# Patient Record
Sex: MALE | Race: OTHER
[De-identification: names, ages, dates, MRNs, and addresses within clinical notes are randomized per-mention and may not be internally consistent; named-entity substitution may affect disease eponyms.]

---

## 2018-06-02 ENCOUNTER — HOSPITAL ENCOUNTER (INPATIENT)
Dept: HOSPITAL 17 - NEPE | Age: 63
LOS: 2 days | Discharge: HOME | DRG: 123 | End: 2018-06-04
Payer: SELF-PAY

## 2018-06-02 VITALS
DIASTOLIC BLOOD PRESSURE: 77 MMHG | OXYGEN SATURATION: 97 % | HEART RATE: 88 BPM | SYSTOLIC BLOOD PRESSURE: 154 MMHG | TEMPERATURE: 97.5 F | RESPIRATION RATE: 18 BRPM

## 2018-06-02 VITALS
RESPIRATION RATE: 14 BRPM | DIASTOLIC BLOOD PRESSURE: 80 MMHG | SYSTOLIC BLOOD PRESSURE: 167 MMHG | HEART RATE: 96 BPM | OXYGEN SATURATION: 97 %

## 2018-06-02 VITALS
DIASTOLIC BLOOD PRESSURE: 86 MMHG | SYSTOLIC BLOOD PRESSURE: 179 MMHG | OXYGEN SATURATION: 96 % | RESPIRATION RATE: 15 BRPM | HEART RATE: 88 BPM | TEMPERATURE: 98.6 F

## 2018-06-02 VITALS
HEART RATE: 88 BPM | DIASTOLIC BLOOD PRESSURE: 96 MMHG | TEMPERATURE: 98.3 F | RESPIRATION RATE: 16 BRPM | OXYGEN SATURATION: 98 % | SYSTOLIC BLOOD PRESSURE: 206 MMHG

## 2018-06-02 VITALS — OXYGEN SATURATION: 97 %

## 2018-06-02 VITALS — BODY MASS INDEX: 34.6 KG/M2 | WEIGHT: 220.46 LBS | HEIGHT: 67 IN

## 2018-06-02 VITALS — OXYGEN SATURATION: 96 %

## 2018-06-02 DIAGNOSIS — Z95.5: ICD-10-CM

## 2018-06-02 DIAGNOSIS — H49.21: Primary | ICD-10-CM

## 2018-06-02 DIAGNOSIS — Z79.84: ICD-10-CM

## 2018-06-02 DIAGNOSIS — I10: ICD-10-CM

## 2018-06-02 DIAGNOSIS — E78.5: ICD-10-CM

## 2018-06-02 DIAGNOSIS — I25.10: ICD-10-CM

## 2018-06-02 DIAGNOSIS — R29.810: ICD-10-CM

## 2018-06-02 DIAGNOSIS — I25.2: ICD-10-CM

## 2018-06-02 DIAGNOSIS — Z79.82: ICD-10-CM

## 2018-06-02 DIAGNOSIS — E11.65: ICD-10-CM

## 2018-06-02 DIAGNOSIS — R20.0: ICD-10-CM

## 2018-06-02 LAB
ALBUMIN SERPL-MCNC: 3.5 GM/DL (ref 3.4–5)
ALP SERPL-CCNC: 108 U/L (ref 45–117)
ALT SERPL-CCNC: 33 U/L (ref 12–78)
APAP SERPL-MCNC: (no result) MCG/ML (ref 10–30)
AST SERPL-CCNC: 21 U/L (ref 15–37)
BASOPHILS # BLD AUTO: 0 TH/MM3 (ref 0–0.2)
BASOPHILS NFR BLD: 0.6 % (ref 0–2)
BILIRUB SERPL-MCNC: 0.3 MG/DL (ref 0.2–1)
BUN SERPL-MCNC: 15 MG/DL (ref 7–18)
CALCIUM SERPL-MCNC: 8.7 MG/DL (ref 8.5–10.1)
CHLORIDE SERPL-SCNC: 99 MEQ/L (ref 98–107)
COLOR UR: YELLOW
CREAT SERPL-MCNC: 0.81 MG/DL (ref 0.6–1.3)
EOSINOPHIL # BLD: 0.1 TH/MM3 (ref 0–0.4)
EOSINOPHIL NFR BLD: 1.4 % (ref 0–4)
ERYTHROCYTE [DISTWIDTH] IN BLOOD BY AUTOMATED COUNT: 13.1 % (ref 11.6–17.2)
GFR SERPLBLD BASED ON 1.73 SQ M-ARVRAT: 97 ML/MIN (ref 89–?)
GLUCOSE SERPL-MCNC: 231 MG/DL (ref 74–106)
GLUCOSE UR STRIP-MCNC: 1000 MG/DL
HCO3 BLD-SCNC: 25.6 MEQ/L (ref 21–32)
HCT VFR BLD CALC: 43.6 % (ref 39–51)
HGB BLD-MCNC: 15 GM/DL (ref 13–17)
HGB UR QL STRIP: (no result)
INR PPP: 1 RATIO
KETONES UR STRIP-MCNC: (no result) MG/DL
LYMPHOCYTES # BLD AUTO: 3.5 TH/MM3 (ref 1–4.8)
LYMPHOCYTES NFR BLD AUTO: 42.6 % (ref 9–44)
MCH RBC QN AUTO: 31.7 PG (ref 27–34)
MCHC RBC AUTO-ENTMCNC: 34.5 % (ref 32–36)
MCV RBC AUTO: 91.8 FL (ref 80–100)
MONOCYTE #: 0.6 TH/MM3 (ref 0–0.9)
MONOCYTES NFR BLD: 7.2 % (ref 0–8)
NEUTROPHILS # BLD AUTO: 4 TH/MM3 (ref 1.8–7.7)
NEUTROPHILS NFR BLD AUTO: 48.2 % (ref 16–70)
NITRITE UR QL STRIP: (no result)
PLATELET # BLD: 257 TH/MM3 (ref 150–450)
PMV BLD AUTO: 7.8 FL (ref 7–11)
PROT SERPL-MCNC: 8 GM/DL (ref 6.4–8.2)
PROTHROMBIN TIME: 10.1 SEC (ref 9.8–11.6)
RBC # BLD AUTO: 4.75 MIL/MM3 (ref 4.5–5.9)
SODIUM SERPL-SCNC: 136 MEQ/L (ref 136–145)
SP GR UR STRIP: 1.03 (ref 1–1.03)
SQUAMOUS #/AREA URNS HPF: <1 /HPF (ref 0–5)
TROPONIN I SERPL-MCNC: (no result) NG/ML (ref 0.02–0.05)
URINE LEUKOCYTE ESTERASE: (no result)
WBC # BLD AUTO: 8.2 TH/MM3 (ref 4–11)

## 2018-06-02 PROCEDURE — 93005 ELECTROCARDIOGRAM TRACING: CPT

## 2018-06-02 PROCEDURE — 83880 ASSAY OF NATRIURETIC PEPTIDE: CPT

## 2018-06-02 PROCEDURE — 84443 ASSAY THYROID STIM HORMONE: CPT

## 2018-06-02 PROCEDURE — 70450 CT HEAD/BRAIN W/O DYE: CPT

## 2018-06-02 PROCEDURE — 80307 DRUG TEST PRSMV CHEM ANLYZR: CPT

## 2018-06-02 PROCEDURE — 80053 COMPREHEN METABOLIC PANEL: CPT

## 2018-06-02 PROCEDURE — 82550 ASSAY OF CK (CPK): CPT

## 2018-06-02 PROCEDURE — 93306 TTE W/DOPPLER COMPLETE: CPT

## 2018-06-02 PROCEDURE — 85652 RBC SED RATE AUTOMATED: CPT

## 2018-06-02 PROCEDURE — 84484 ASSAY OF TROPONIN QUANT: CPT

## 2018-06-02 PROCEDURE — 86430 RHEUMATOID FACTOR TEST QUAL: CPT

## 2018-06-02 PROCEDURE — 83690 ASSAY OF LIPASE: CPT

## 2018-06-02 PROCEDURE — 83036 HEMOGLOBIN GLYCOSYLATED A1C: CPT

## 2018-06-02 PROCEDURE — 85730 THROMBOPLASTIN TIME PARTIAL: CPT

## 2018-06-02 PROCEDURE — 71045 X-RAY EXAM CHEST 1 VIEW: CPT

## 2018-06-02 PROCEDURE — 93880 EXTRACRANIAL BILAT STUDY: CPT

## 2018-06-02 PROCEDURE — 85610 PROTHROMBIN TIME: CPT

## 2018-06-02 PROCEDURE — 86038 ANTINUCLEAR ANTIBODIES: CPT

## 2018-06-02 PROCEDURE — 81001 URINALYSIS AUTO W/SCOPE: CPT

## 2018-06-02 PROCEDURE — 86140 C-REACTIVE PROTEIN: CPT

## 2018-06-02 PROCEDURE — 82948 REAGENT STRIP/BLOOD GLUCOSE: CPT

## 2018-06-02 PROCEDURE — 70544 MR ANGIOGRAPHY HEAD W/O DYE: CPT

## 2018-06-02 PROCEDURE — 70551 MRI BRAIN STEM W/O DYE: CPT

## 2018-06-02 PROCEDURE — 80061 LIPID PANEL: CPT

## 2018-06-02 PROCEDURE — 85025 COMPLETE CBC W/AUTO DIFF WBC: CPT

## 2018-06-02 RX ADMIN — ACYCLOVIR SCH UNITS: 800 TABLET ORAL at 21:00

## 2018-06-02 RX ADMIN — Medication SCH ML: at 21:00

## 2018-06-02 RX ADMIN — PHENYTOIN SODIUM SCH MLS/HR: 50 INJECTION INTRAMUSCULAR; INTRAVENOUS at 20:00

## 2018-06-02 RX ADMIN — PRAVASTATIN SODIUM SCH MG: 40 TABLET ORAL at 21:00

## 2018-06-02 RX ADMIN — STANDARDIZED SENNA CONCENTRATE AND DOCUSATE SODIUM SCH TAB: 8.6; 5 TABLET, FILM COATED ORAL at 21:00

## 2018-06-02 RX ADMIN — ACETAMINOPHEN PRN MG: 325 TABLET ORAL at 20:56

## 2018-06-02 RX ADMIN — PHENYTOIN SODIUM SCH MLS/HR: 50 INJECTION INTRAMUSCULAR; INTRAVENOUS at 20:52

## 2018-06-02 RX ADMIN — INSULIN ASPART SCH: 100 INJECTION, SOLUTION INTRAVENOUS; SUBCUTANEOUS at 21:00

## 2018-06-02 NOTE — RADRPT
EXAM DATE:  6/2/2018 4:56 PM EDT

AGE/SEX:        63 years / Male



INDICATIONS:  Syncope. Double vision with headaches.



CLINICAL DATA:  This is the patient's initial encounter. Patient reports that signs and symptoms have
 been present for 1 day and indicates a pain score of 0/10. 

                                                                          

MEDICAL/SURGICAL HISTORY:       Hypertension.  Diabetes mellitus type II. . Cardiac stents.



COMPARISON:      [facility] [place], CHEST SINGLE AP, 2/16/2018.  .



FINDINGS:  

A single AP view of the chest demonstrates the lungs to be symmetrically aerated without evidence of 
mass, infiltrate or effusion.  The cardiomediastinal contours are unremarkable.  Osseous structures a
re intact.  





CONCLUSION: 

No evidence of acute cardiopulmonary disease.



Electronically signed by: Abdiaziz Wakefield MD  6/2/2018 5:14 PM EDT

## 2018-06-02 NOTE — PD
HPI


Chief Complaint:  Neuro Symptoms/ Deficits


Time Seen by Provider:  16:41


Travel History


International Travel<30 days:  No


Contact w/Intl Traveler<30days:  No


Traveled to known affect area:  No





History of Present Illness


HPI


Patient has had double vision episode for the past 4 days, intermittent however 

has now become more and more frequent.  He has double vision when both eyes are 

open.  But he no longer has double vision once he closes one eye or the other.  

Patient states that he has a ear fullness and ringing, and when he moves her 

head suddenly he gets room spinning sensation. additionally patient has noted 

flattening/droop to his right face since 3 days ago, most pronounced 3d ago but 

has improved according to patient over time.  patient denied loss of vision, 

gait unsteadiness, ha, cp, fever, paralysis of his extremities.  





No known drug allergy


Past medical history significant for corrective lenses, MI with stent placed, 

hypertension, appendectomy, diabetes





PFSH


Past Medical History


Anxiety:  No


Depression:  No


Cancer:  No


Cardiovascular Problems:  Yes


Diabetes:  Yes


Diminished Hearing:  No


Genitourinary:  No


Hypertension:  Yes


Immune Disorder:  No


Implanted Vascular Access Dvce:  Yes


Musculoskeletal:  No


Neurologic:  No


Psychiatric:  No


Reproductive:  No


Respiratory:  No


Sickle Cell Disease:  No





Past Surgical History


Appendectomy:  Yes


Body Medical Devices:  STENTS


Cardiac Surgery:  Yes (STENTS PLACED, CARDIAC CATH)


Other Surgery:  Yes





Social History


Alcohol Use:  No


Tobacco Use:  No


Substance Use:  No





Allergies-Medications


(Allergen,Severity, Reaction):  


Coded Allergies:  


     No Known Allergies (Verified  Allergy, Unknown, 3/1/18)


Reported Meds & Prescriptions





Reported Meds & Active Scripts


Active


Novolog Inj (Insulin Aspart) 1,000 Unit/10 Ml Vial 2-12 Units SQ ACHS


     Max dose at bedtime ( 8) units; sugars less than 70,(0) units; sugars


     150-199,(2) units; sugars 200-249,(4) units; sugars 250-299,(7) units;


     sugars 300-349,(10) units; sugars greater than 349,(12)units


Lisinopril 10 Mg Tab 10 Mg PO DAILY


Lantus Inj (Insulin Glargine) 1,000 Unit/10 Ml Vial 40 Units SQ HS


Reported


Gabapentin 600 Mg Tab 600 Mg PO TID


Metformin (Metformin HCl) 1,000 Mg Tab 1,000 Mg PO BIDPC








Review of Systems


General / Constitutional:  No: Fever


Eyes:  Positive: Diploplia, No: Visual changes


HENT:  Positive: Vertigo, No: Headaches


Cardiovascular:  No: Chest Pain or Discomfort


Respiratory:  No: Shortness of Breath


Gastrointestinal:  No: Abdominal Pain


Genitourinary:  No: Dysuria


Musculoskeletal:  No: Pain


Skin:  No Rash


Neurologic:  Positive: Weakness, Slurred Speech (4 days ago)


Psychiatric:  No: Depression


Endocrine:  No: Polydipsia


Hematologic/Lymphatic:  No: Easy Bruising





Physical Exam


Narrative


GENERAL: 


SKIN: Warm and dry.


HEAD: Atraumatic. Normocephalic. 


EYES: Pupils equal and round. No scleral icterus. No injection or drainage.  

Right medial eye deviation/strabismus, however patient still has near full 

range of motion in all quadrants, this is most consistent with a lateral rectus 

abducens palsy, pupils are equal round and reactive to light,


ENT: No nasal bleeding or discharge.  Mucous membranes pink and moist.no 

hemotympanum


NECK: Trachea midline. No JVD. no carotid murmur


CARDIOVASCULAR: Regular rate and rhythm.  


RESPIRATORY: No accessory muscle use. Clear to auscultation. Breath sounds 

equal bilaterally. 


GASTROINTESTINAL: Abdomen soft, non-tender, nondistended.


MUSCULOSKELETAL: Extremities without clubbing, cyanosis, or edema. No obvious 

deformities. 


NEUROLOGICAL: Awake and alert. No obvious cranial nerve deficits.  Motor 

grossly within normal limits. Five out of 5 muscle strength in the arms and 

legs.  Normal speech. EXCEPT for a slight right sided facial droop noted


PSYCHIATRIC: Appropriate mood and affect; insight and judgment normal.





Data


Data


Last Documented VS





Vital Signs








  Date Time  Temp Pulse Resp B/P (MAP) Pulse Ox O2 Delivery O2 Flow Rate FiO2


 


6/2/18 16:55  96 14 167/80 (109) 97 Room Air  


 


6/2/18 16:34 98.3       








Orders





 Orders


Electrocardiogram (6/2/18 16:41)


Complete Blood Count With Diff (6/2/18 16:41)


Comprehensive Metabolic Panel (6/2/18 16:41)


Ckmb (Isoenzyme) Profile (6/2/18 16:41)


Troponin I (6/2/18 16:41)


B-Type Natriuretic Peptide (6/2/18 16:41)


Prothrombin Time / Inr (Pt) (6/2/18 16:41)


Act Partial Throm Time (Ptt) (6/2/18 16:41)


Lipase (6/2/18 16:41)


Urinalysis - C+S If Indicated (6/2/18 16:41)


Thyroid Stimulating Hormone (6/2/18 16:41)


Chest, Single Ap (6/2/18 16:41)


Ct Brain W/O Iv Contrast(Rout) (6/2/18 16:41)


Iv Access Insert/Monitor (6/2/18 16:41)


Ecg Monitoring (6/2/18 16:41)


Oximetry (6/2/18 16:41)


Drug Screen, Random Urine (6/2/18 16:41)


Alcohol (Ethanol) (6/2/18 16:41)


Salicylates (Aspirin) (6/2/18 16:41)


Tylenol (Acetaminophen) (6/2/18 16:41)


Bedside Glucose JANEEN.CSUGAR (6/2/18 19:37)


Blood Glucose Goal (Criteria) (6/2/18 19:37)


Hypoglycemia 70 Mg/Dl Or < (6/2/18 19:37)


Notify Dr: Other (6/2/18 19:37)


Dextrose 50% In Rose (Vial) Inj (D50w (Vi (6/2/18 19:45)


Glucagon Inj (Glucagon Inj) (6/2/18 19:45)


Insulin Aspart Supplemtl Scale (Novolog (6/2/18 21:00)


Hemoglobin (Hgb) A1c (6/3/18 06:00)


Lipid Profile (6/3/18 06:00)


Westergren Sedimentation Rate (6/2/18 19:37)


C-Reactive Protein (Crp) (6/2/18 19:37)


Rheumatoid Screen/Titer (Rf) (6/3/18 06:00)


Iona Screen (6/3/18 06:00)


Consult Neurology (6/2/18 )


Admit To Inpatient (6/2/18 )


Code Status (6/2/18 19:37)


Vital Signs (Adult) Q4H (6/2/18 19:37)


Nih Stroke Scale - Nihss .On admission and discharge (6/2/18 19:37)


Neuro Checks Q4H (6/2/18 19:37)


Consult Pt Eval & Treat (6/2/18 19:37)


Case Management Consult (6/2/18 )


Activity Bed Rest (6/2/18 19:37)


Nursing Bedside Swallow Assess .ONCE (6/2/18 19:37)


Scd Bilateral/Knee High JANEEN.QSHIFT (6/2/18 19:37)


Mra Brain W/O Contrast (Cow) (6/2/18 )


Mri Brain W/O Contrast (6/2/18 )


Echo 2d Comp With Doppler (6/2/18 )


Resp Oxygen Nc Stroke (6/2/18 )


^ Hold Medication (6/2/18 19:37)


Sodium Chloride 0.9% Flush (Ns Flush) (6/2/18 21:00)


Sodium Chloride 0.9% Flush (Ns Flush) (6/2/18 19:45)


Sodium Chlor 0.9% 1000 Ml Inj (Ns 1000 M (6/2/18 20:00)


Enalaprilat Inj (Vasotec Inj) (6/2/18 19:45)


Aspirin Chew (Aspirin Chew) (6/3/18 09:00)


Pravastatin (Pravachol) (6/2/18 21:00)


Bedside Glucose JANEEN.CSUGAR (6/2/18 19:37)


^ Discontinue Insulin Orders (6/2/18 19:37)


Insulin Aspart Supplemtl Scale (Novolog (6/2/18 21:00)


Dextrose 50% In Rose (Vial) Inj (D50w (Vi (6/2/18 19:45)


Glucagon Inj (Glucagon Inj) (6/2/18 19:45)


Cardiac Monitor / Telemetry JANEEN.Q8H (6/2/18 19:37)


Consult Stroke Navigator (6/2/18 )


Scd Bilateral/Knee High JANEEN.BID (6/2/18 19:37)


Dave Bilateral/Knee High JANEEN.QSHIFT (6/2/18 19:41)


Inpatient Certification (6/2/18 )


Sodium Chloride 0.9% Flush (Ns Flush) (6/2/18 19:45)


Sodium Chloride 0.9% Flush (Ns Flush) (6/2/18 21:00)


Comprehensive Metabolic Panel (6/3/18 06:00)


Complete Blood Count With Diff (6/3/18 06:00)


Troponin I (6/3/18 00:00)


Troponin I (6/3/18 06:00)


Acetaminophen (Tylenol) (6/2/18 19:45)


Acetamin-Hydrocod 325-5 Mg (Norco  5-325 (6/2/18 19:45)


Docusate Sodium-Senna (Meredith-Colace) (6/2/18 21:00)


Magnesium Hydroxide Liq (Milk Of Magnesi (6/2/18 19:45)


Sennosides (Senokot) (6/2/18 19:45)


Bisacodyl Supp (Dulcolax Supp) (6/2/18 19:45)


Lactulose Liq (Lactulose Liq) (6/2/18 19:45)


Insulin Detemir Inj (Levemir Inj) (6/2/18 21:00)


Morphine Inj (Morphine Inj) (6/2/18 20:00)


(Hub Use Only)Inp Phy Cons/Ref (6/2/18 )


Admit Order (Ed Use Only) (6/2/18 20:20)





Labs





Laboratory Tests








Test


  6/2/18


16:55 6/2/18


18:15


 


White Blood Count 8.2 TH/MM3  


 


Red Blood Count 4.75 MIL/MM3  


 


Hemoglobin 15.0 GM/DL  


 


Hematocrit 43.6 %  


 


Mean Corpuscular Volume 91.8 FL  


 


Mean Corpuscular Hemoglobin 31.7 PG  


 


Mean Corpuscular Hemoglobin


Concent 34.5 % 


  


 


 


Red Cell Distribution Width 13.1 %  


 


Platelet Count 257 TH/MM3  


 


Mean Platelet Volume 7.8 FL  


 


Neutrophils (%) (Auto) 48.2 %  


 


Lymphocytes (%) (Auto) 42.6 %  


 


Monocytes (%) (Auto) 7.2 %  


 


Eosinophils (%) (Auto) 1.4 %  


 


Basophils (%) (Auto) 0.6 %  


 


Neutrophils # (Auto) 4.0 TH/MM3  


 


Lymphocytes # (Auto) 3.5 TH/MM3  


 


Monocytes # (Auto) 0.6 TH/MM3  


 


Eosinophils # (Auto) 0.1 TH/MM3  


 


Basophils # (Auto) 0.0 TH/MM3  


 


CBC Comment DIFF FINAL  


 


Differential Comment   


 


Prothrombin Time 10.1 SEC  


 


Prothromb Time International


Ratio 1.0 RATIO 


  


 


 


Activated Partial


Thromboplast Time 26.9 SEC 


  


 


 


Blood Urea Nitrogen 15 MG/DL  


 


Creatinine 0.81 MG/DL  


 


Random Glucose 231 MG/DL  


 


Total Protein 8.0 GM/DL  


 


Albumin 3.5 GM/DL  


 


Calcium Level 8.7 MG/DL  


 


Alkaline Phosphatase 108 U/L  


 


Aspartate Amino Transf


(AST/SGOT) 21 U/L 


  


 


 


Alanine Aminotransferase


(ALT/SGPT) 33 U/L 


  


 


 


Total Bilirubin 0.3 MG/DL  


 


Sodium Level 136 MEQ/L  


 


Potassium Level 3.8 MEQ/L  


 


Chloride Level 99 MEQ/L  


 


Carbon Dioxide Level 25.6 MEQ/L  


 


Anion Gap 11 MEQ/L  


 


Estimat Glomerular Filtration


Rate 97 ML/MIN 


  


 


 


Total Creatine Kinase 96 U/L  


 


Troponin I


  LESS THAN 0.02


NG/ML 


 


 


C-Reactive Protein 0.62 MG/DL  


 


B-Type Natriuretic Peptide 6 PG/ML  


 


Lipase 133 U/L  


 


Thyroid Stimulating Hormone


3rd Gen 1.100 uIU/ML 


  


 


 


Salicylates Level


  LESS THAN 1.7


MG/DL 


 


 


Acetaminophen Level


  LESS THAN 2.0


MCG/ML 


 


 


Ethyl Alcohol Level


  LESS THAN 3


MG/DL 


 


 


Urine Color  YELLOW 


 


Urine Turbidity  CLEAR 


 


Urine pH  5.5 


 


Urine Specific Gravity  1.028 


 


Urine Protein  NEG mg/dL 


 


Urine Glucose (UA)  1000 mg/dL 


 


Urine Ketones  NEG mg/dL 


 


Urine Occult Blood  NEG 


 


Urine Nitrite  NEG 


 


Urine Bilirubin  NEG 


 


Urine Urobilinogen


  


  LESS THAN 2.0


MG/DL


 


Urine Leukocyte Esterase  NEG 


 


Urine RBC


  


  LESS THAN 1


/hpf


 


Urine WBC


  


  LESS THAN 1


/hpf


 


Urine Squamous Epithelial


Cells 


  <1 /hpf 


 


 


Microscopic Urinalysis Comment


  


  CULT NOT


INDICATED


 


Urine Opiates Screen  NEG 


 


Urine Barbiturates Screen  NEG 


 


Urine Amphetamines Screen  NEG 


 


Urine Benzodiazepines Screen  NEG 


 


Urine Cocaine Screen  NEG 


 


Urine Cannabinoids Screen  NEG 











MDM


Medical Decision Making


Medical Screen Exam Complete:  Yes


Emergency Medical Condition:  Yes


Medical Record Reviewed:  Yes


Interpretation(s)


EKG is normal sinus rhythm, 96 bpm, normal intervals, nonspecific ST-T wave 

changes, no ST elevation MI pattern noted


Differential Diagnosis


Abducens palsy versus cavernous sinus thrombosis versus aneurysm versus central 

vertigo versus brain stem injury


Narrative Course


CBC shows no leukocytosis, no anemia, normal platelet count, no left shift


Coagulation profile is within normal limits


toxicology screen negative for salicylates, Tylenol or alcohol


Electrolytes are all within normal limits except for random glucose of 231, 

normal kidney liver and pancreatic functions,


First set of cardiac enzymes negative


Head CT read by radiologist as negative noncontrast CT


Critical Care Narrative


CRITICAL CARE NOTE: With evaluation of the patient, labs, EKG, receipt of 

radiologic studies, administration of medications, reevaluation the patient and 

discussion of the patient with the admitting physicians, the total critical 

care time was [30] minutes. Time to perform other separately billable 

procedures was not included in the critical care time.





Diagnosis





 Primary Impression:  


 Abducent nerve palsy, right eye


 Additional Impression:  


 r/o CVA





Admitting Information


Admitting Physician Requests:  Observation











Scottie Jalloh MD Jun 2, 2018 16:48

## 2018-06-02 NOTE — HHI.HP
__________________________________________________





[facility]


Service


[facility]


Primary Care Physician


No Primary Care Physician


Admission Diagnosis





CVA, 6th nerve pasly


Diagnoses:  


(1) CVA (cerebral vascular accident)


Diagnosis:  Principal





(2) Abducens (sixth) nerve palsy


Diagnosis:  Principal





(3) HTN (hypertension)


Diagnosis:  Principal





(4) DM (diabetes mellitus)


Diagnosis:  Principal





Travel History


International Travel<30 Days:  No


Contact w/Intl Traveler <30 Da:  No


Traveled to Known Affected Are:  No


History of Present Illness


This is a 63 year male with PMH of HTN, DM and CAD who presented to ER with 

complaints of double vision x3 days.  States he was driving today and had 

significant difficulty seeing the road, states symptoms improve when he closes 

one eye.  Also notes facial droop and oral numbness/tingling since earlier this 

morning.  Denies h/o CVA.  Does note DM is uncontrolled w/ elevated BS despite 

Metformin/Insulin.  On arrival, /96, HR 88, O2 sat 98% on RA, Afebrile.  

CBC unremarkable.  Chemistry unremarkable except for .  Troponin 

negative.  INR 1.0.  Urine Drug Screen negative.  Alcohol negative.  UA 

negative for UTI.  CT Head negative.  CXR no acute findings.  On exam, patient 

noted to have right medial eye deviation w/ 6th nerve palsy.  No previous h/o 

similar symptoms.





Review of Systems


Except as stated in HPI:  all other systems reviewed are Neg


ROS: 14 point review of systems otherwise negative.





Past Family Social History


Past Medical History


PMH:  HTN, DM and CAD


Past Surgical History


PAST SURGICAL HISTORY: Cardiac Stents, Appendectomy


Allergies:  


Coded Allergies:  


     No Known Allergies (Verified  Allergy, Unknown, 3/1/18)


Family History


PAST FAMILY HISTORY:  Reviewed.  No h/o DM or CAD


Social History


PAST SOCIAL HISTORY: Negative for alcohol, tobacco or drugs





Physical Exam


Vital Signs





Vital Signs








  Date Time  Temp Pulse Resp B/P (MAP) Pulse Ox O2 Delivery O2 Flow Rate FiO2


 


18 20:54 98.6 88 15 179/86 (117) 96 Room Air  


 


18 16:55  96 14 167/80 (109) 97 Room Air  


 


18 16:50     97   


 


18 16:34 98.3 88 16 206/96 (132 98   








Physical Exam


PE:


GENERAL: Pleasant middle-aged  male in no acute distress.  Wife at 

bedside


HEENT: PERRLA, right eye bandaged, +right strabismus, medial deviation. No 

scleral icterus or conjunctival pallor. No lid lag or facial droop.  


CARDIOVASCULAR: Regular rate and rhythm.  No obvious murmurs to auscultation. 

No chest tenderness to palpation. 


RESPIRATORY: No obvious rhonchi or wheezing. Clear to auscultation. Breath 

sounds equal bilaterally. 


GASTROINTESTINAL: Abdomen soft, non-tender, nondistended. BS normal. 


MUSCULOSKELETAL: Extremities without clubbing, cyanosis, or edema. No obvious 

deformities. 


NEUROLOGICAL: Awake, alert and oriented x4. No focal neurologic deficits. 

Moving both upper and lower extremities spontaneously.


Laboratory





Laboratory Tests








Test


  18


16:55 18


18:15


 


White Blood Count 8.2  


 


Red Blood Count 4.75  


 


Hemoglobin 15.0  


 


Hematocrit 43.6  


 


Mean Corpuscular Volume 91.8  


 


Mean Corpuscular Hemoglobin 31.7  


 


Mean Corpuscular Hemoglobin


Concent 34.5 


  


 


 


Red Cell Distribution Width 13.1  


 


Platelet Count 257  


 


Mean Platelet Volume 7.8  


 


Neutrophils (%) (Auto) 48.2  


 


Lymphocytes (%) (Auto) 42.6  


 


Monocytes (%) (Auto) 7.2  


 


Eosinophils (%) (Auto) 1.4  


 


Basophils (%) (Auto) 0.6  


 


Neutrophils # (Auto) 4.0  


 


Lymphocytes # (Auto) 3.5  


 


Monocytes # (Auto) 0.6  


 


Eosinophils # (Auto) 0.1  


 


Basophils # (Auto) 0.0  


 


CBC Comment DIFF FINAL  


 


Differential Comment   


 


Prothrombin Time 10.1  


 


Prothromb Time International


Ratio 1.0 


  


 


 


Activated Partial


Thromboplast Time 26.9 


  


 


 


Blood Urea Nitrogen 15  


 


Creatinine 0.81  


 


Random Glucose 231  


 


Total Protein 8.0  


 


Albumin 3.5  


 


Calcium Level 8.7  


 


Alkaline Phosphatase 108  


 


Aspartate Amino Transf


(AST/SGOT) 21 


  


 


 


Alanine Aminotransferase


(ALT/SGPT) 33 


  


 


 


Total Bilirubin 0.3  


 


Sodium Level 136  


 


Potassium Level 3.8  


 


Chloride Level 99  


 


Carbon Dioxide Level 25.6  


 


Anion Gap 11  


 


Estimat Glomerular Filtration


Rate 97 


  


 


 


Total Creatine Kinase 96  


 


Troponin I LESS THAN 0.02  


 


B-Type Natriuretic Peptide 6  


 


Lipase 133  


 


Thyroid Stimulating Hormone


3rd Gen 1.100 


  


 


 


Salicylates Level LESS THAN 1.7  


 


Acetaminophen Level LESS THAN 2.0  


 


Ethyl Alcohol Level LESS THAN 3  


 


Urine Color  YELLOW 


 


Urine Turbidity  CLEAR 


 


Urine pH  5.5 


 


Urine Specific Gravity  1.028 


 


Urine Protein  NEG 


 


Urine Glucose (UA)  1000 


 


Urine Ketones  NEG 


 


Urine Occult Blood  NEG 


 


Urine Nitrite  NEG 


 


Urine Bilirubin  NEG 


 


Urine Urobilinogen  LESS THAN 2.0 


 


Urine Leukocyte Esterase  NEG 


 


Urine RBC  LESS THAN 1 


 


Urine WBC  LESS THAN 1 


 


Urine Squamous Epithelial


Cells 


  <1 


 


 


Microscopic Urinalysis Comment


  


  CULT NOT


INDICATED


 


Urine Opiates Screen  NEG 


 


Urine Barbiturates Screen  NEG 


 


Urine Amphetamines Screen  NEG 


 


Urine Benzodiazepines Screen  NEG 


 


Urine Cocaine Screen  NEG 


 


Urine Cannabinoids Screen  NEG 








Result Diagram:  


18








Caprini VTE Risk Assessment


Caprini VTE Risk Assessment:  No/Low Risk (score <= 1)


Caprini Risk Assessment Model











 Point Value = 1          Point Value = 2  Point Value = 3  Point Value = 5


 


Age 41-60


Minor surgery


BMI > 25 kg/m2


Swollen legs


Varicose veins


Pregnancy or postpartum


History of unexplained or recurrent


   spontaneous 


Oral contraceptives or hormone


   replacement


Sepsis (< 1 month)


Serious lung disease, including


   pneumonia (< 1 month)


Abnormal pulmonary function


Acute myocardial infarction


Congestive heart failure (< 1 month)


History of inflammatory bowel disease


Medical patient at bed rest Age 61-74


Arthroscopic surgery


Major open surgery (> 45 min)


Laparoscopic surgery (> 45 min)


Malignancy


Confined to bed (> 72 hours)


Immobilizing plaster cast


Central venous access Age >= 75


History of VTE


Family history of VTE


Factor V Leiden


Prothrombin 41324N


Lupus anticoagulant


Anticardiolipin antibodies


Elevated serum homocysteine


Heparin-induced thrombocytopenia


Other congenital or acquired


   thrombophilia Stroke (< 1 month)


Elective arthroplasty


Hip, pelvis, or leg fracture


Acute spinal cord injury (< 1 month)








Prophylaxis Regimen











   Total Risk


Factor Score Risk Level Prophylaxis Regimen


 


0-1      Low Early ambulation


 


2 Moderate Order ONE of the following:


*Sequential Compression Device (SCD)


*Heparin 5000 units SQ BID


 


3-4 Higher Order ONE of the following medications:


*Heparin 5000 units SQ TID


*Enoxaparin/Lovenox 40 mg SQ daily (WT < 150 kg, CrCl > 30 mL/min)


*Enoxaparin/Lovenox 30 mg SQ daily (WT < 150 kg, CrCl > 10-29 mL/min)


*Enoxaparin/Lovenox 30 mg SQ BID (WT < 150 kg, CrCl > 30 mL/min)


AND/OR


*Sequential Compression Device (SCD)


 


5 or more Highest Order ONE of the following medications:


*Heparin 5000 units SQ TID (Preferred with Epidurals)


*Enoxaparin/Lovenox 40 mg SQ daily (WT < 150 kg, CrCl > 30 mL/min)


*Enoxaparin/Lovenox 30 mg SQ daily (WT < 150 kg, CrCl > 10-29 mL/min)


*Enoxaparin/Lovenox 30 mg SQ BID (WT < 150 kg, CrCl > 30 mL/min)


AND


*Sequential Compression Device (SCD)











Assessment and Plan


Problem List:  


(1) CVA (cerebral vascular accident)


ICD Code:  I63.9 - Cerebral infarction, unspecified


(2) Abducens (sixth) nerve palsy


ICD Code:  H49.20 - Sixth [abducent] nerve palsy, unspecified eye


(3) HTN (hypertension)


ICD Code:  I10 - Essential (primary) hypertension


(4) DM (diabetes mellitus)


ICD Code:  E11.9 - Type 2 diabetes mellitus without complications


Assessment and Plan


A/P:


1.  CVA:  acute onset of facial droop and devi-oral numbness/tingling starting 

earlier this afternoon.  CT Head w/ no acute findings, images reviewed by me.  

Neuro checks, Check MRI/MRA to eval for underlying CVA/tumor.  ASA, Statin.  

Consult Neurology for further evaluation/recommendations.  Check Lipid profile, 

Hgb A1c.  Check serial cardiac enzymes to eval for underlying ischemia. 


2.  Abducens Nerve Palsy:  Right.  c/o double vision x3 days, in association w/ 

facial droop/oral numbness high concern for CVA.  Check MRI/MRA as above.  

Check ESR/CRP, TSH, Lipid Profile, Hgb A1c.  Right eye patch.  Neuro eval as 

above.  


3.  HTN:  Uncontrolled.   systolic, will allow for permissive HTN in 

light of CVA, antihypertensives prn for BP >220 systolic.


4.  DM:  Uncontrolled.  .  Hold Metformin.  Resume home Insulin.


5.  DVT Prophylaxis:  SCD/Teds


6.  Social work for d/c planning as needed. 


7.  Case discussed w/ ER physician at length, labs/records/imaging reviewed by 

me.





Physician Certification


2 Midnight Certification Type:  Admission for Inpatient Services


Order for Inpatient Services


The services are ordered in accordance with Medicare regulations or non-

Medicare payer requirements, as applicable.  In the case of services not 

specified as inpatient-only, they are appropriately provided as inpatient 

services in accordance with the 2-midnight benchmark.


Estimated LOS (days):  2


 days is the estimated time the patient will need to remain in the hospital, 

assuming treatment plan goals are met and no additional complications.


Post-Hospital Plan:  Not yet determined











Liudmila Baez MD 2018 21:14

## 2018-06-02 NOTE — RADRPT
EXAM DATE:  6/2/2018 8:19 PM EDT

AGE/SEX:        63 years / Male



INDICATIONS:    .  Diplopia.



CLINICAL DATA:  This is the patient's initial encounter. Patient reports that signs and symptoms have
 been present for 1 day and indicates a pain score of 0/10. 

                                                                          

MEDICAL/SURGICAL HISTORY:       Diabetes mellitus type II.  Hypertension.  Cardiovascular disease. CA
BG.  Coronary artery stent.  Appendectomy.  Peritonitis.



[place]      [facility] [place].  .



TECHNIQUE: Multiplanar, multisequence examination of the brain was performed without contrast.



FINDINGS:  

Cerebrum:  The ventricles are normal for age.  No evidence of midline shift, mass lesion, hemorrhage 
or acute infarction.  No extraaxial fluid collections are seen.  The pituitary gland and suprasellar 
cistern are normal in configuration.

White Matter:  Minimal periventricular and subcortical white matter small vessel ischemic changes are
 noted.

Posterior Fossa: The cerebellum and brainstem are intact.  The 4th ventricle is midline.  The cerebel
lopontine angle is unremarkable.  The cerebellar tonsils are normal in position.

Diffusion Imaging:  No focal areas of restricted diffusion are seen.  No evidence of acute infarction
.

Extracranial:  The visualized portions of the orbits and paranasal sinuses are unremarkable.



CONCLUSION: 

1.  Minimal periventricular and subcortical white matter small vessel ischemic changes.

2.  No acute infarct, acute hemorrhage, mass effect or extra-axial fluid collections.



Electronically signed by: Gennaro Lucas MD  6/2/2018 8:29 PM EDT

## 2018-06-02 NOTE — RADRPT
EXAM DATE:  6/2/2018 6:06 PM EDT

AGE/SEX:        63 years / Male



INDICATIONS:  Visual changes, double vision, right facial droop and headache for four days.



CLINICAL DATA:  This is the patient's initial encounter. Patient reports that signs and symptoms have
 been present for 4 - 6 days and indicates a pain score of 2/10. 

                                                                          

MEDICAL/SURGICAL HISTORY:   Cardiovascular disease.  Hypertension.  Diabetes mellitus type I. Appende
ctomy.



RADIATION DOSE:  37.50 CTDI (mGy)







COMPARISON:      No prior [facility] exams available for comparison.





TECHNIQUE:  CT of the head without contrast.  Using automated exposure control and adjustment of the 
mA and/or kV according to patient size, radiation dose was kept as low as reasonably achievable to ob
tain optimal diagnostic quality images.



FINDINGS: 

Cerebrum:  The ventricles are normal for age.  No evidence of midline shift, mass lesion, hemorrhage 
or acute infarction.  No extraaxial fluid collections are seen.

Posterior Fossa:  The cerebellum and brainstem are intact.  The 4th ventricle is midline.  The cerebe
llopontine angle is unremarkable.

Extracranial:  The visualized portion of the orbits is intact.

Skull:  The calvaria is intact.  No evidence of skull fracture.





CONCLUSION:

1.  Negative noncontrast head CT.



Electronically signed by: Abdiaziz Wakefield MD  6/2/2018 6:10 PM EDT

## 2018-06-02 NOTE — RADRPT
EXAM DATE:  6/2/2018 8:19 PM EDT

AGE/SEX:        63 years / Male



INDICATIONS:  . Diplopia.



CLINICAL DATA:  This is the patient's initial encounter. Patient reports that signs and symptoms have
 been present for 1 day and indicates a pain score of 0/10. 

                                                                          

MEDICAL/SURGICAL HISTORY:       Diabetes mellitus type II.  Heart disease.  Hypertension. Coronary ar
lamin stent.  CABG.  Appendectomy.  Peritonitis.



COMPARISON:      No prior [facility] exams available for comparison.



TECHNIQUE:     3D time-of-flight MRA was performed.  Source images, multiplanar STS MIP, and 3D volum
e MIP reconstructions were reviewed.



FINDINGS:     

There is excellent visualization of the major intracranial arteries out to the second-order branch ve
ssels.  While to moderate focal stenosis is noted involving the right proximal posterior cerebral art
abner. There is no evidence for aneurysm, vessel truncation, and no evidence for vascular malformation.
 There is a patent right posterior communicating artery.



CONCLUSION: 

1.  Mild to moderate focal stenosis involving the right proximal posterior cerebral artery.

2.  Patent right posterior communicating artery.





Electronically signed by: Gennaro Lucas MD  6/2/2018 8:32 PM EDT

## 2018-06-03 VITALS
TEMPERATURE: 97.2 F | HEART RATE: 81 BPM | RESPIRATION RATE: 18 BRPM | SYSTOLIC BLOOD PRESSURE: 170 MMHG | DIASTOLIC BLOOD PRESSURE: 89 MMHG | OXYGEN SATURATION: 97 %

## 2018-06-03 VITALS
TEMPERATURE: 97.4 F | SYSTOLIC BLOOD PRESSURE: 122 MMHG | RESPIRATION RATE: 18 BRPM | HEART RATE: 69 BPM | OXYGEN SATURATION: 96 % | DIASTOLIC BLOOD PRESSURE: 59 MMHG

## 2018-06-03 VITALS
DIASTOLIC BLOOD PRESSURE: 83 MMHG | OXYGEN SATURATION: 96 % | HEART RATE: 81 BPM | RESPIRATION RATE: 18 BRPM | TEMPERATURE: 97.9 F | SYSTOLIC BLOOD PRESSURE: 151 MMHG

## 2018-06-03 VITALS
RESPIRATION RATE: 18 BRPM | TEMPERATURE: 97.4 F | HEART RATE: 78 BPM | SYSTOLIC BLOOD PRESSURE: 173 MMHG | OXYGEN SATURATION: 97 % | DIASTOLIC BLOOD PRESSURE: 91 MMHG

## 2018-06-03 VITALS
HEART RATE: 75 BPM | RESPIRATION RATE: 18 BRPM | DIASTOLIC BLOOD PRESSURE: 72 MMHG | TEMPERATURE: 97.4 F | SYSTOLIC BLOOD PRESSURE: 150 MMHG | OXYGEN SATURATION: 98 %

## 2018-06-03 VITALS
TEMPERATURE: 97.7 F | RESPIRATION RATE: 18 BRPM | HEART RATE: 75 BPM | DIASTOLIC BLOOD PRESSURE: 80 MMHG | OXYGEN SATURATION: 98 % | SYSTOLIC BLOOD PRESSURE: 166 MMHG

## 2018-06-03 VITALS — HEART RATE: 74 BPM

## 2018-06-03 VITALS — HEART RATE: 69 BPM

## 2018-06-03 LAB
ALBUMIN SERPL-MCNC: 2.9 GM/DL (ref 3.4–5)
ALP SERPL-CCNC: 73 U/L (ref 45–117)
ALT SERPL-CCNC: 26 U/L (ref 12–78)
AST SERPL-CCNC: 15 U/L (ref 15–37)
BASOPHILS # BLD AUTO: 0 TH/MM3 (ref 0–0.2)
BASOPHILS NFR BLD: 0.7 % (ref 0–2)
BILIRUB SERPL-MCNC: 0.3 MG/DL (ref 0.2–1)
BUN SERPL-MCNC: 11 MG/DL (ref 7–18)
CALCIUM SERPL-MCNC: 8.1 MG/DL (ref 8.5–10.1)
CHLORIDE SERPL-SCNC: 104 MEQ/L (ref 98–107)
CHOLEST SERPL-MCNC: 198 MG/DL (ref 120–200)
CHOLESTEROL/ HDL RATIO: 5.57 RATIO
CREAT SERPL-MCNC: 0.51 MG/DL (ref 0.6–1.3)
EOSINOPHIL # BLD: 0.2 TH/MM3 (ref 0–0.4)
EOSINOPHIL NFR BLD: 2.6 % (ref 0–4)
ERYTHROCYTE [DISTWIDTH] IN BLOOD BY AUTOMATED COUNT: 13 % (ref 11.6–17.2)
GFR SERPLBLD BASED ON 1.73 SQ M-ARVRAT: 165 ML/MIN (ref 89–?)
GLUCOSE SERPL-MCNC: 91 MG/DL (ref 74–106)
HBA1C MFR BLD: 11.8 % (ref 4.3–6)
HCO3 BLD-SCNC: 25 MEQ/L (ref 21–32)
HCT VFR BLD CALC: 40.2 % (ref 39–51)
HDLC SERPL-MCNC: 35.5 MG/DL (ref 40–60)
HGB BLD-MCNC: 13.9 GM/DL (ref 13–17)
LDLC SERPL-MCNC: 115 MG/DL (ref 0–99)
LYMPHOCYTES # BLD AUTO: 3.4 TH/MM3 (ref 1–4.8)
LYMPHOCYTES NFR BLD AUTO: 49.3 % (ref 9–44)
MCH RBC QN AUTO: 31.4 PG (ref 27–34)
MCHC RBC AUTO-ENTMCNC: 34.7 % (ref 32–36)
MCV RBC AUTO: 90.4 FL (ref 80–100)
MONOCYTE #: 0.5 TH/MM3 (ref 0–0.9)
MONOCYTES NFR BLD: 7 % (ref 0–8)
NEUTROPHILS # BLD AUTO: 2.8 TH/MM3 (ref 1.8–7.7)
NEUTROPHILS NFR BLD AUTO: 40.4 % (ref 16–70)
PLATELET # BLD: 248 TH/MM3 (ref 150–450)
PMV BLD AUTO: 7.6 FL (ref 7–11)
PROT SERPL-MCNC: 6.7 GM/DL (ref 6.4–8.2)
RBC # BLD AUTO: 4.44 MIL/MM3 (ref 4.5–5.9)
RHEUMATOID FACT SER QL LA: NEGATIVE
SODIUM SERPL-SCNC: 139 MEQ/L (ref 136–145)
TRIGL SERPL-MCNC: 239 MG/DL (ref 42–150)
TROPONIN I SERPL-MCNC: (no result) NG/ML (ref 0.02–0.05)
WBC # BLD AUTO: 6.8 TH/MM3 (ref 4–11)

## 2018-06-03 RX ADMIN — PRAVASTATIN SODIUM SCH MG: 40 TABLET ORAL at 20:06

## 2018-06-03 RX ADMIN — INSULIN ASPART SCH: 100 INJECTION, SOLUTION INTRAVENOUS; SUBCUTANEOUS at 17:25

## 2018-06-03 RX ADMIN — Medication SCH ML: at 20:10

## 2018-06-03 RX ADMIN — HYDROCODONE BITARTRATE AND ACETAMINOPHEN PRN TAB: 5; 325 TABLET ORAL at 16:50

## 2018-06-03 RX ADMIN — INSULIN ASPART SCH: 100 INJECTION, SOLUTION INTRAVENOUS; SUBCUTANEOUS at 20:08

## 2018-06-03 RX ADMIN — INSULIN ASPART SCH: 100 INJECTION, SOLUTION INTRAVENOUS; SUBCUTANEOUS at 12:00

## 2018-06-03 RX ADMIN — HYDROCODONE BITARTRATE AND ACETAMINOPHEN PRN TAB: 5; 325 TABLET ORAL at 08:54

## 2018-06-03 RX ADMIN — ACYCLOVIR SCH UNITS: 800 TABLET ORAL at 20:08

## 2018-06-03 RX ADMIN — STANDARDIZED SENNA CONCENTRATE AND DOCUSATE SODIUM SCH TAB: 8.6; 5 TABLET, FILM COATED ORAL at 20:06

## 2018-06-03 RX ADMIN — ACETAMINOPHEN PRN MG: 325 TABLET ORAL at 06:23

## 2018-06-03 RX ADMIN — STANDARDIZED SENNA CONCENTRATE AND DOCUSATE SODIUM SCH TAB: 8.6; 5 TABLET, FILM COATED ORAL at 08:51

## 2018-06-03 RX ADMIN — Medication SCH ML: at 09:00

## 2018-06-03 RX ADMIN — INSULIN ASPART SCH: 100 INJECTION, SOLUTION INTRAVENOUS; SUBCUTANEOUS at 08:00

## 2018-06-03 RX ADMIN — ACETAMINOPHEN PRN MG: 325 TABLET ORAL at 23:31

## 2018-06-03 RX ADMIN — ASPIRIN 81 MG SCH MG: 81 TABLET ORAL at 08:52

## 2018-06-03 RX ADMIN — Medication SCH ML: at 21:00

## 2018-06-03 NOTE — HHI.PR
Subjective


Remarks


Double vision remains.  Patient still has right sided lip numbness.  No 

extremity weakness or numbness.





Objective





Vital Signs








  Date Time  Temp Pulse Resp B/P (MAP) Pulse Ox O2 Delivery O2 Flow Rate FiO2


 


6/3/18 08:00 97.2 81 18 170/89 (116) 97   


 


6/3/18 04:15 97.4 69 18 122/59 (80) 96   


 


6/2/18 23:41     96   21


 


6/2/18 22:48 97.5 88 18 154/77 (102) 97   


 


6/2/18 21:48   18     


 


6/2/18 20:54 98.6 88 15 179/86 (117) 96 Room Air  


 


6/2/18 16:55  96 14 167/80 (109) 97 Room Air  


 


6/2/18 16:50     97   


 


6/2/18 16:34 98.3 88 16 206/96 (132) 98   














I/O      


 


 6/2/18 6/2/18 6/2/18 6/3/18 6/3/18 6/3/18





 07:00 15:00 23:00 07:00 15:00 23:00


 


Intake Total    360 ml  


 


Output Total    400 ml  


 


Balance    -40 ml  


 


      


 


Intake Oral    360 ml  


 


Output Urine Total    400 ml  


 


# Bowel Movements    0  








Result Diagram:  


6/3/18 0427                                                                    

            6/3/18 0427





Objective Remarks


GENERAL: NAD, A&Ox3


HEAD: Normocephalic. 


NECK: Supple, trachea midline. No lymphadenopathy.


EYES: No scleral icterus. No injection or drainage.  Double vision with patch 

removed.  Patient has patched right eye.


CARDIOVASCULAR: Regular rate and rhythm without murmurs, gallops, or rubs. 


RESPIRATORY: Breath sounds equal bilaterally. No accessory muscle use.


GASTROINTESTINAL: Abdomen soft, non-tender, nondistended. 


MUSCULOSKELETAL: No cyanosis, or edema. 


SKIN: Warm and dry.


NEURO:  No focal neurological deficitis.





A/P


Problem List:  


(1) Abducens (sixth) nerve palsy


ICD Code:  H49.20 - Sixth [abducent] nerve palsy, unspecified eye


(2) CVA (cerebral vascular accident)


ICD Code:  I63.9 - Cerebral infarction, unspecified


(3) Abducent nerve palsy, right eye


ICD Code:  H49.21 - Sixth [abducent] nerve palsy, right eye


Status:  Acute


Assessment and Plan


63-year-old male admitted secondary to acute CVA





Acute CVA


Right lip numbness.


Abducens nerve palsy at right


Neurology following


Continue imaging workup


Follow neurologic status


Continue aspirin


Continue statin





Hypertension


Allow permissive elevations for now


Follow blood pressures


Adjust treatments as needed





Diabetes mellitus type 2


Follow blood sugars


Insulin sliding scale


Diabetic diet





DVT prophylaxis


SCDs











Siva Gamez MD Bimal 3, 2018 10:03

## 2018-06-03 NOTE — RADRPT
EXAM DATE:  6/3/2018 7:06 PM EDT

AGE/SEX:        63 years / Male



INDICATIONS:  Cerebrovascular accident.



CLINICAL DATA:  This is the patient's initial encounter. Patient reports that signs and symptoms have
 been present for 1 day and indicates a pain score of 0/10. 

                                                                          

MEDICAL/SURGICAL HISTORY:       Hypertension. Myocardial infarction. Diabetes.  Appendectomy. Stents 
placed. Cardiac catheterization. Left shoulder surgery. Right knee surgery. Bilateral hand surgery. A
mputation of toe.



COMPARISON:      No prior [facility] exams available for comparison. No external comparison.



VELOCITY PARAMETERS:

ICA/CCA Ratio:  Right 1.0 , Left 1.3 

ICA:  Right 76.2 cm/sec, Left 104.4 cm/sec

CCA: Right 78.9 cm/sec, Left 82.2 cm/sec

ECA: Right 110.3 cm/sec, Left 139.9 cm/sec

Vertebral: Right 47.1 cm/sec  antegrade, Left 39.5 cm/sec antegrade 



FINDINGS:  

Right Carotid:  No significant stenosis is visualized.  The waveforms are within normal limits.

Left Carotid:  No significant stenosis is visualized.  The waveforms are within normal limits.

Other:  None.



CONCLUSION: 

1.  Right Internal Carotid Artery: Mild atherosclerotic plaque is noted. No hemodynamically significa
nt stenosis.

2.  Left Internal Carotid Artery: Mild atherosclerotic plaque is noted. No hemodynamically significan
t stenosis.



Electronically signed by: Gennaro Lucas MD  6/3/2018 7:08 PM EDT

## 2018-06-03 NOTE — EKG
Date Performed: 06/02/2018       Time Performed: 17:00:08

 

PTAGE:      63 years

 

EKG:      Sinus rhythm 

 

 INFERIOR MYOCARDIAL INFARCTION ABNORMAL ECG

 

PREVIOUS TRACING       : 02/14/2018 04.42 Poor initial anterior forces in V1-V3. Since previous florentino
ng, no significant change.

 

DOCTOR:   Claudio Jaime  Interpretating Date/Time  06/03/2018 15:53:04

## 2018-06-03 NOTE — MB
cc:

Jessica Hickman MD

****

 

 

DATE:

06/03/2018

 

REASON FOR CONSULTATION:

Stroke.

 

HISTORY OF PRESENT ILLNESS:

This is a pleasant 63-year-old man with a history of hypertension, 

diabetes, heart disease, came in because of double vision.  He was on 

the road when all of a sudden looked like some type of construction 

machine was coming towards him and noted that everything was doubled, 

resolved with one eye closed. He started noticing some tingling in his

right lip going to his nose.  He has been having issues with his 

diabetes.  He came in with elevated blood pressure, per chart notes.  

He just completed some physical therapy assessment.  He had his right 

eye patched.  No headache, no weakness in the arms or legs.

 

PAST MEDICAL HISTORY:

As stated.

 

ALLERGIES TO MEDICINE:

NONE REPORTED.

 

ACTIVE MEDICINES AT HOME:

1.  Gabapentin.

2.  Metformin.

 

SOCIAL HISTORY:

, does not smoke, drink or use drugs.

 

PHYSICAL EXAMINATION:

VITAL SIGNS:  Temperature is 97.2, pulse 81, respiratory rate 18, 

blood pressure 170/89.  Came in with a BP of 206/96.

NECK:  Supple.  No carotid bruits.

HEART:  Regular.

NEUROLOGIC:  He is awake and alert.  He is oriented, and fluent.  He 

does have a mild ptosis on the right.  He does have limitation to 

abduction of the right eye.  There is a 6th nerve palsy. Does have 

diplopia vertical, resolves with one eye closed.  No drift, no leg 

lag.  Cerebellar testing is normal.  DTRs are 1+.  Toes withdraws.  

Gait is per PT.

 

LABORATORY DATA:

Sedimentation rate is 9.  CBC is really not remarkable.  Coag panel is

normal.  Chemistries:  Potassium today is 3.3.  His cholesterol is 

198, triglycerides 239, HDL 35, .  TSH 1.100, CRP 0.62.  Tox 

screen negative.  Urine unremarkable.  Immunology rheumatoid factor 

negative.  ANGELA is pending.

 

His hemoglobin A1c is still pending.

 

IMAGING:

MRA [facility] shows mild to moderate stenosis in the right 

proximal posterior cerebral artery and patent right posterior 

communicating artery.  MRI brain shows white matter disease, but no 

acute infarct. Looking at the full report, there is no aneurysm on his

MRA.

 

ASSESSMENT AND PLAN:

A 63-year-old man with multiple risk factors for stroke, now with 

residual 6th nerve palsy and diplopia, mild ptosis.  This is due to 

the 6th nerve.  I would continue to patch his right eye. He will see 

ophthalmology as an outpatient.  Risk factors, diabetes needs to be 

controlled and managed accordingly.  His A1c is still pending.  

Control his blood pressure.  He takes aspirin, but not on a daily 

basis. Go ahead and increase his aspirin to 2 baby aspirins.  Start 

him on a statin.  PT, OT and carotid ultrasound are still pending as 

is his echo.  If his workup otherwise is unremarkable, can be 

discharged with PT and have him followup with me in 2 weeks, as well 

as with an ophthalmologist.

 

 

__________________________________

MD JUVENAL Neff/RENU

D: 06/03/2018, 11:08 AM

T: 06/03/2018, 12:33 PM

Visit #: [id number]

Job #: [id number]

## 2018-06-04 VITALS
HEART RATE: 80 BPM | OXYGEN SATURATION: 98 % | RESPIRATION RATE: 18 BRPM | DIASTOLIC BLOOD PRESSURE: 91 MMHG | TEMPERATURE: 97.8 F | SYSTOLIC BLOOD PRESSURE: 192 MMHG

## 2018-06-04 VITALS
OXYGEN SATURATION: 98 % | SYSTOLIC BLOOD PRESSURE: 169 MMHG | RESPIRATION RATE: 18 BRPM | HEART RATE: 80 BPM | TEMPERATURE: 97.2 F | DIASTOLIC BLOOD PRESSURE: 88 MMHG

## 2018-06-04 VITALS — HEART RATE: 68 BPM

## 2018-06-04 VITALS
HEART RATE: 73 BPM | SYSTOLIC BLOOD PRESSURE: 149 MMHG | DIASTOLIC BLOOD PRESSURE: 70 MMHG | OXYGEN SATURATION: 98 % | TEMPERATURE: 97.2 F | RESPIRATION RATE: 18 BRPM

## 2018-06-04 LAB
ALBUMIN SERPL-MCNC: 3.3 GM/DL (ref 3.4–5)
ALP SERPL-CCNC: 70 U/L (ref 45–117)
ALT SERPL-CCNC: 31 U/L (ref 12–78)
AST SERPL-CCNC: 22 U/L (ref 15–37)
BASOPHILS # BLD AUTO: 0 TH/MM3 (ref 0–0.2)
BASOPHILS NFR BLD: 0.7 % (ref 0–2)
BILIRUB SERPL-MCNC: 0.4 MG/DL (ref 0.2–1)
BUN SERPL-MCNC: 8 MG/DL (ref 7–18)
CALCIUM SERPL-MCNC: 8.5 MG/DL (ref 8.5–10.1)
CHLORIDE SERPL-SCNC: 102 MEQ/L (ref 98–107)
CREAT SERPL-MCNC: 0.55 MG/DL (ref 0.6–1.3)
EOSINOPHIL # BLD: 0.1 TH/MM3 (ref 0–0.4)
EOSINOPHIL NFR BLD: 2.1 % (ref 0–4)
ERYTHROCYTE [DISTWIDTH] IN BLOOD BY AUTOMATED COUNT: 13.1 % (ref 11.6–17.2)
GFR SERPLBLD BASED ON 1.73 SQ M-ARVRAT: 151 ML/MIN (ref 89–?)
GLUCOSE SERPL-MCNC: 140 MG/DL (ref 74–106)
HCO3 BLD-SCNC: 25.1 MEQ/L (ref 21–32)
HCT VFR BLD CALC: 46.3 % (ref 39–51)
HGB BLD-MCNC: 15.7 GM/DL (ref 13–17)
LYMPHOCYTES # BLD AUTO: 3.1 TH/MM3 (ref 1–4.8)
LYMPHOCYTES NFR BLD AUTO: 42.9 % (ref 9–44)
MCH RBC QN AUTO: 30.8 PG (ref 27–34)
MCHC RBC AUTO-ENTMCNC: 33.8 % (ref 32–36)
MCV RBC AUTO: 91.1 FL (ref 80–100)
MONOCYTE #: 0.4 TH/MM3 (ref 0–0.9)
MONOCYTES NFR BLD: 5.7 % (ref 0–8)
NEUTROPHILS # BLD AUTO: 3.5 TH/MM3 (ref 1.8–7.7)
NEUTROPHILS NFR BLD AUTO: 48.6 % (ref 16–70)
PLATELET # BLD: 279 TH/MM3 (ref 150–450)
PMV BLD AUTO: 8.1 FL (ref 7–11)
PROT SERPL-MCNC: 7.8 GM/DL (ref 6.4–8.2)
RBC # BLD AUTO: 5.09 MIL/MM3 (ref 4.5–5.9)
SODIUM SERPL-SCNC: 138 MEQ/L (ref 136–145)
WBC # BLD AUTO: 7.2 TH/MM3 (ref 4–11)

## 2018-06-04 RX ADMIN — INSULIN ASPART SCH: 100 INJECTION, SOLUTION INTRAVENOUS; SUBCUTANEOUS at 08:00

## 2018-06-04 RX ADMIN — Medication SCH ML: at 08:29

## 2018-06-04 RX ADMIN — ASPIRIN 81 MG SCH MG: 81 TABLET ORAL at 08:28

## 2018-06-04 RX ADMIN — ACETAMINOPHEN PRN MG: 325 TABLET ORAL at 05:29

## 2018-06-04 RX ADMIN — STANDARDIZED SENNA CONCENTRATE AND DOCUSATE SODIUM SCH TAB: 8.6; 5 TABLET, FILM COATED ORAL at 08:28

## 2018-06-04 RX ADMIN — INSULIN ASPART SCH: 100 INJECTION, SOLUTION INTRAVENOUS; SUBCUTANEOUS at 12:52

## 2018-06-04 RX ADMIN — PHENYTOIN SODIUM SCH MLS/HR: 50 INJECTION INTRAMUSCULAR; INTRAVENOUS at 01:46

## 2018-06-04 NOTE — HHI.PR
Subjective


Remarks


Doing okay with no symptoms of weakness and numbness.  No symptoms of 

difficulty with swallowing or any headaches.  Wants to go home.  Doing well 

with the right eye patch.





Objective


Vitals





Vital Signs








  Date Time  Temp Pulse Resp B/P (MAP) Pulse Ox O2 Delivery O2 Flow Rate FiO2


 


6/4/18 08:00 97.2 80 18 169/88 (115) 98   


 


6/4/18 04:33 97.2 73 18 149/70 (96) 98   


 


6/4/18 03:57  68      


 


6/3/18 23:52  69      


 


6/3/18 23:20 97.4 75 18 150/72 (98) 98   


 


6/3/18 20:00 97.7 75 18 166/80 (108) 98   


 


6/3/18 19:50  74      


 


6/3/18 16:00 97.9 81 18 151/83 (105) 96   


 


6/3/18 12:00 97.4 78 18 173/91 (118) 97   














I/O      


 


 6/3/18 6/3/18 6/3/18 6/4/18 6/4/18 6/4/18





 07:00 15:00 23:00 07:00 15:00 23:00


 


Intake Total 360 ml  1020 ml 360 ml  


 


Output Total 400 ml  400 ml   


 


Balance -40 ml  620 ml 360 ml  


 


      


 


Intake Oral 360 ml  1020 ml 360 ml  


 


Output Urine Total 400 ml  400 ml   


 


# Voids   2 2  


 


# Bowel Movements 0  0 0  








Result Diagram:  


6/3/18 0427                                                                    

            6/3/18 0427





Objective Remarks


GENERAL: This is a well-nourished, well-developed patient, in no apparent 

distress.


HEENT: Right eye patch in place


CARDIOVASCULAR: Regular rate and rhythm 


RESPIRATORY: Clear to auscultation. Breath sounds equal bilaterally. No wheezes

, rales, or rhonchi.  


GASTROINTESTINAL: Abdomen soft, non-tender, nondistended. Normal active bowel 

sounds


MUSCULOSKELETAL: Extremities without clubbing, cyanosis, or edema.


NEURO:  Alert & Oriented x4 to person, place, time, situation.  Moves all ext x4





A/P


Problem List:  


(1) Abducens (sixth) nerve palsy


ICD Code:  H49.20 - Sixth [abducent] nerve palsy, unspecified eye


Status:  Acute


(2) HTN (hypertension)


ICD Code:  I10 - Essential (primary) hypertension


Status:  Chronic


(3) DM (diabetes mellitus)


ICD Code:  E11.9 - Type 2 diabetes mellitus without complications


Status:  Chronic


Assessment and Plan





Right lip numbness.


Acute right sixth abducens nerve palsy 


Neurology following and recommending aspirin and eyepatch and outpatient follow-

up with both neurology and ophthalmology.


MRI of the brain showed no active CVA or stroke


Neurological status has been stable during hospitalization


Continue aspirin


Continue statin





Hypertension, chronic essential resume home lisinopril





Diabetes mellitus type 2, chronicoverall fair control, patient with 

neurological Complications follow blood sugars


Insulin sliding scale


Diabetic diet





DVT prophylaxis


SCDs


Discharge Planning


Discharge patient to home


Condition on discharge: Improved


Diabetic diet as tolerated


Ad Lupe activity


Rx written: Aspirin, Pravachol


Follow-up with primary care physician\


Follow-up with neurology Dr. Hickman


Follow-up with ophthalmology





Problem Qualifiers





(1) Abducens (sixth) nerve palsy:  


Qualified Codes:  H49.21 - Sixth [abducent] nerve palsy, right eye


(2) DM (diabetes mellitus):  








Usha Wang MD Jun 4, 2018 10:37

## 2018-06-04 NOTE — ECHRPT
Indication:   cva/tia 

 

 CONCLUSIONS 

 Normal left ventricular size. Wall thickness is measured at the upper limits of normal.  

 The left ventricular systolic function is normal with an estimated ejection fraction in the range of
 55-60%.     

 No definite wall motion abnormalities. 

 

 Trace mitral valve regurgitation.  

 

 BP:        /         HR:                          Rhythm: 

 

 MEASUREMENTS  (Male / Female) Normal Values       Technical Quality: 

 2D ECHO 

 LV Diastolic Diameter PLAX        5.6 cm                4.2 - 5.9 / 3.9 - 5.3 cm 

 LV Systolic Diameter PLAX         4.0 cm                 

 IVS Diastolic Thickness           1.1 cm                0.6 - 1.0 / 0.6 - 0.9 cm 

 LVPW Diastolic Thickness          0.8 cm                0.6 - 1.0 / 0.6 - 0.9 cm 

 LV Relative Wall Thickness        0.3                    

 RV Internal Dim ED PLAX           2.6 cm                 

 LA Systolic Diameter LX           3.9 cm                3.0 - 4.0 / 2.7 - 3.8 cm 

 

 M-MODE 

 Aortic Root Diameter MM           3.2 cm                 

 AV Cusp Separation MM             2.1 cm                 

 

 DOPPLER 

 Mitral E Point Velocity           71.6 cm/s              

 Mitral A Point Velocity           59.2 cm/s              

 Mitral E to A Ratio               1.2                    

 TR Peak Velocity                  204.0 cm/s             

 TR Peak Gradient                  16.6 mmHg              

 

 

 FINDINGS 

 

 LEFT VENTRICLE 

 Normal left ventricular size. Wall thickness is measured at the upper limits of normal.  

 The left ventricular systolic function is normal with an estimated ejection fraction in the range of
 55-60%.     

 No definite wall motion abnormalities. 

 

 RIGHT VENTRICLE 

 Normal right ventricular size and systolic function.   

 

 LEFT ATRIUM 

 The left atrial size is normal.   

 

 RIGHT ATRIUM 

 The right atrial size is normal.   

 

 ATRIAL SEPTUM 

 Normal atrial septal thickness without atrial level shunting by limited color doppler interrogation.
   

 

 AORTA 

 The aortic root and proximal ascending aorta are normal in size on limited imaging.   

 

 MITRAL VALVE 

 Trace mitral valve regurgitation.  

 

 AORTIC VALVE 

 Trileaflet aortic valve. No aortic valve stenosis or regurgitation.   

 

 TRICUSPID VALVE 

 Structurally normal tricuspid valve. No tricuspid valve stenosis or regurgitation.   

 

 PULMONARY VALVE 

 The pulmonary valve is not well visualized.   

 

 VESSELS 

 The inferior vena cava is normal in size.   

 

 PERICARDIUM 

 No pericardial effusion.   

 

 

 

 

  Rene Longoria MD 

  (Electronically Signed) 

  Final Date:04 June 2018 12:46

## 2018-06-04 NOTE — HHI.PR
Subjective


Remarks


doing well no new issues diplopia not changed





Objective





Vital Signs








  Date Time  Temp Pulse Resp B/P (MAP) Pulse Ox O2 Delivery O2 Flow Rate FiO2


 


6/4/18 08:00 97.2 80 18 169/88 (115) 98   


 


6/4/18 04:33 97.2 73 18 149/70 (96) 98   


 


6/4/18 03:57  68      


 


6/3/18 23:52  69      


 


6/3/18 23:20 97.4 75 18 150/72 (98) 98   


 


6/3/18 20:00 97.7 75 18 166/80 (108) 98   


 


6/3/18 19:50  74      


 


6/3/18 16:00 97.9 81 18 151/83 (105) 96   


 


6/3/18 12:00 97.4 78 18 173/91 (118) 97   














I/O      


 


 6/3/18 6/3/18 6/3/18 6/4/18 6/4/18 6/4/18





 07:00 15:00 23:00 07:00 15:00 23:00


 


Intake Total 360 ml  1020 ml 360 ml  


 


Output Total 400 ml  400 ml   


 


Balance -40 ml  620 ml 360 ml  


 


      


 


Intake Oral 360 ml  1020 ml 360 ml  


 


Output Urine Total 400 ml  400 ml   


 


# Voids   2 2  


 


# Bowel Movements 0  0 0  








Result Diagram:  


6/3/18 0427                                                                    

            6/3/18 0427





Objective Remarks


awake alert fluent


right 6th cn mild palsy and diplopia


motor intact





Assessment and Plan


Assessment and Plan


6th ch nerve palsy -od


htn


hld


dm


antiplatelets


statin


dm control


risk factors d/w pt


f/u outpt opthal may need prism


no driving


f/u neuro in 2-3 weeks.


ok to d/c per neurology.











Jessica Hickman MD Jun 4, 2018 10:04